# Patient Record
Sex: MALE | Race: WHITE
[De-identification: names, ages, dates, MRNs, and addresses within clinical notes are randomized per-mention and may not be internally consistent; named-entity substitution may affect disease eponyms.]

---

## 2017-09-02 ENCOUNTER — HOSPITAL ENCOUNTER (EMERGENCY)
Dept: HOSPITAL 61 - ER | Age: 47
Discharge: HOME | End: 2017-09-02
Payer: COMMERCIAL

## 2017-09-02 VITALS — DIASTOLIC BLOOD PRESSURE: 58 MMHG | SYSTOLIC BLOOD PRESSURE: 126 MMHG

## 2017-09-02 VITALS — HEIGHT: 69 IN | BODY MASS INDEX: 29.47 KG/M2 | WEIGHT: 199 LBS

## 2017-09-02 DIAGNOSIS — J06.9: ICD-10-CM

## 2017-09-02 DIAGNOSIS — J18.1: Primary | ICD-10-CM

## 2017-09-02 DIAGNOSIS — E78.00: ICD-10-CM

## 2017-09-02 PROCEDURE — 87070 CULTURE OTHR SPECIMN AEROBIC: CPT

## 2017-09-02 PROCEDURE — 71020: CPT

## 2017-09-02 PROCEDURE — 87880 STREP A ASSAY W/OPTIC: CPT

## 2017-09-02 PROCEDURE — 99285 EMERGENCY DEPT VISIT HI MDM: CPT

## 2017-09-02 NOTE — PHYS DOC
Past Medical History


Past Medical History:  High Cholesterol, Pancreatitis


Additional Past Medical Histor:  pacreatitis


Past Surgical History:  Other


Additional Past Surgical Histo:  rt foot


Alcohol Use:  None


Drug Use:  None





Adult General


Chief Complaint


Chief Complaint:  COUGH





HPI


HPI





Patient is a 47  year old who presents with a productive cough, nasal congestion

, body aches since yesterday. Patient states he has history of pneumonia. He 

states he feels similar to the last time he had pneumonia. Patient denies any 

fever. He states his wife was in the ED today being evaluated for dislodged PEG 

tube and he takes care of her. Patient denies any chest pain or shortness of 

breath





Review of Systems


Review of Systems





Constitutional: see HPI


Eyes: Denies change in visual acuity, redness, or eye pain []


HENT:  nasal congestion 


Respiratory:  cough 


Cardiovascular: No additional information not addressed in HPI []


GI: Denies abdominal pain, nausea, vomiting, bloody stools or diarrhea []


: Denies dysuria or hematuria []


Musculoskeletal: Denies back pain or joint pain []


Integument: Denies rash or skin lesions []


Neurologic: Denies headache, focal weakness or sensory changes []


Endocrine: Denies polyuria or polydipsia []





Allergies


Allergies





Allergies








Coded Allergies Type Severity Reaction Last Updated Verified


 


  No Known Drug Allergies    4/14/15 No











Physical Exam


Physical Exam





Constitutional: Well developed, well nourished, no acute distress, non-toxic 

appearance. []


HENT: Normocephalic, atraumatic, bilateral external ears normal, oropharynx 

moist, no oral exudates, patient is congested nasally


Eyes: PERRLA, EOMI, conjunctiva normal, no discharge. [] 


Neck: Normal range of motion, no tenderness, supple, no stridor. [] 


Cardiovascular:Heart rate regular rhythm, no murmur []


Lungs & Thorax:  Bilateral breath sounds clear to auscultation []


Abdomen: Bowel sounds normal, soft, no tenderness, no masses, no pulsatile 

masses. [] 


Skin: Warm, dry, no erythema, no rash. [] 


Back: No tenderness, no CVA tenderness. [] 


Extremities: No tenderness, no cyanosis, no clubbing, ROM intact, no edema. [] 


Neurologic: Alert and oriented X 3, normal motor function, normal sensory 

function, no focal deficits noted. []


Psychologic: Affect normal, judgement normal, mood normal. []





Current Patient Data


Vital Signs





 Vital Signs








  Date Time  Temp Pulse Resp B/P (MAP) Pulse Ox O2 Delivery O2 Flow Rate FiO2


 


9/2/17 17:19 98.7 81 20  97 Room Air  





 98.7       











EKG


EKG


[]





Radiology/Procedures


Radiology/Procedures


[]





Course & Med Decision Making


Course & Med Decision Making


Pertinent Labs and Imaging studies reviewed. (See chart for details)





This is a 47-year-old male patient presented to the ED today with a productive 

cough nasal congestion and body aches since yesterday. He states he has had  

similar symptoms when he had pneumonia.





Chest x-ray interpreted by Dr. Bhardwaj was suspicious for left lower lobe 

pneumonia. Patient was given prescription for Levaquin,Tessalon Perles 

prednisone and albuterol inhaler. He was also given prescription for Tessalon 

Perles. OTC decongestants recommended. Follow-up with PCP in one week. 

Instructed to return to the ED if symptoms worsen.





Dragon Disclaimer


Dragon Disclaimer


This electronic medical record was generated, in whole or in part, using a 

voice recognition dictation system.





Departure


Departure


Impression:  


 Primary Impression:  


 Community acquired pneumonia


 Additional Impression:  


 Upper respiratory infection


Disposition:  01 HOME, SELF-CARE


Condition:  STABLE


Referrals:  


LEXX RESENDEZ MD (PCP)


Follow-up with your doctor in one week


Patient Instructions:  Pneumonia, Adult, Upper Respiratory Infection, Adult, 

Easy-to-Read





Additional Instructions:  


You were seen for pneumonia. Please take the prescribed medicines as ordered. 

Ensure you complete your antibiotics. Come back to the ED at any point symptoms 

worsen or you unable to take your prescribed medicine. Follow-up with your 

doctor next week.


Scripts


Albuterol Sulfate (Proair Respiclick) 90 Mcg [email or web address]


1 PUFF IH PRN Q6HRS Y for SHORTNESS OF BREATH, #1 INHALER


   Prov: MUTUNGATANGELA APRN         9/2/17 


Benzonatate (TESSALON PERLE) 100 Mg Capsule


1 CAP PO TID, #30 CAP


   Prov: MUTUNGATANGELA APRN         9/2/17 


Prednisone (PREDNISONE) 50 Mg Tablet


1 TAB PO DAILY, #5 TAB


   Prov: MUTUNGA,TANGELA APRN         9/2/17 


Levofloxacin (LEVAQUIN) 500 Mg Tablet


1 TAB PO DAILY, #7 TAB


   Prov: MUTUNGA,TANGELA APRN         9/2/17





Problem Qualifiers








 Primary Impression:  


 Community acquired pneumonia


 Laterality:  left  Lung location:  lower lobe of lung  Qualified Codes:  J18.1 

- Lobar pneumonia, unspecified organism


 Additional Impression:  


 Upper respiratory infection


 URI type:  unspecified URI  Qualified Codes:  J06.9 - Acute upper respiratory 

infection, unspecified








TANGELA BETANCOURT APRN Sep 2, 2017 18:37

## 2017-09-03 LAB
NEGATIVE OBC STREP: (no result)
POSITIVE OBC STREP: (no result)

## 2017-09-03 NOTE — RAD
EXAM: CHEST 2 VIEWS 



History: Cough



COMPARISON: 5/13/2015



TECHNIQUE: PA and lateral chest radiographs



FINDINGS:  The cardiomediastinal silhouette is within normal limits. Minimal

linear left lung base atelectasis or infiltrate appears less prominent

compared to prior exam.        



IMPRESSION:  Minimal left lung base atelectasis or infiltrate appears less

prominent compared to prior exam.

## 2021-02-23 ENCOUNTER — HOSPITAL ENCOUNTER (OUTPATIENT)
Dept: HOSPITAL 63 - LAB | Age: 51
End: 2021-02-23
Attending: NURSE ANESTHETIST, CERTIFIED REGISTERED
Payer: SELF-PAY

## 2021-02-23 DIAGNOSIS — Z20.822: ICD-10-CM

## 2021-02-23 DIAGNOSIS — Z01.812: Primary | ICD-10-CM

## 2021-02-23 DIAGNOSIS — H26.9: ICD-10-CM

## 2021-02-23 PROCEDURE — U0003 INFECTIOUS AGENT DETECTION BY NUCLEIC ACID (DNA OR RNA); SEVERE ACUTE RESPIRATORY SYNDROME CORONAVIRUS 2 (SARS-COV-2) (CORONAVIRUS DISEASE [COVID-19]), AMPLIFIED PROBE TECHNIQUE, MAKING USE OF HIGH THROUGHPUT TECHNOLOGIES AS DESCRIBED BY CMS-2020-01-R: HCPCS

## 2021-02-23 PROCEDURE — C9803 HOPD COVID-19 SPEC COLLECT: HCPCS

## 2021-02-26 ENCOUNTER — HOSPITAL ENCOUNTER (OUTPATIENT)
Dept: HOSPITAL 63 - SURG | Age: 51
Discharge: HOME | End: 2021-02-26
Attending: INTERNAL MEDICINE
Payer: COMMERCIAL

## 2021-02-26 VITALS
SYSTOLIC BLOOD PRESSURE: 117 MMHG | SYSTOLIC BLOOD PRESSURE: 117 MMHG | DIASTOLIC BLOOD PRESSURE: 76 MMHG | DIASTOLIC BLOOD PRESSURE: 76 MMHG

## 2021-02-26 DIAGNOSIS — Z79.899: ICD-10-CM

## 2021-02-26 DIAGNOSIS — D12.5: ICD-10-CM

## 2021-02-26 DIAGNOSIS — K29.50: ICD-10-CM

## 2021-02-26 DIAGNOSIS — R13.10: ICD-10-CM

## 2021-02-26 DIAGNOSIS — D12.8: ICD-10-CM

## 2021-02-26 DIAGNOSIS — Z88.8: ICD-10-CM

## 2021-02-26 DIAGNOSIS — K25.9: ICD-10-CM

## 2021-02-26 DIAGNOSIS — R19.5: Primary | ICD-10-CM

## 2021-02-26 DIAGNOSIS — K63.89: ICD-10-CM

## 2021-02-26 DIAGNOSIS — K57.30: ICD-10-CM

## 2021-02-26 DIAGNOSIS — R12: ICD-10-CM

## 2021-02-26 DIAGNOSIS — K64.8: ICD-10-CM

## 2021-02-26 DIAGNOSIS — Z72.89: ICD-10-CM

## 2021-02-26 DIAGNOSIS — K21.00: ICD-10-CM

## 2021-02-26 PROCEDURE — 88342 IMHCHEM/IMCYTCHM 1ST ANTB: CPT

## 2021-02-26 PROCEDURE — 45380 COLONOSCOPY AND BIOPSY: CPT

## 2021-02-26 PROCEDURE — 43239 EGD BIOPSY SINGLE/MULTIPLE: CPT

## 2021-02-26 PROCEDURE — 45385 COLONOSCOPY W/LESION REMOVAL: CPT

## 2021-02-26 PROCEDURE — 88305 TISSUE EXAM BY PATHOLOGIST: CPT

## 2021-03-03 NOTE — PATHOLOGY
[facility] Accession Number: [id number]

.                                                                01

Material submitted:                                        .

PART A: gastrointestinal site - ANTRUM BIOPSY

PART B: sigmoid colon - SIGMOID POLYP BIOPSY

PART C: rectum - RECTAL POLYP HOT SNARE

.                                                                01

Clinical history:                                          .

+ COLOGUARD

.                                                                02

**********************************************************************

Diagnosis:

A.  Gastric biopsies, antrum:

- Chronic gastritis, mild, with focal erosion and acute inflammation.

.

B.  Colon biopsy, sigmoid polyp:

- Tubular adenoma.

.

C.  Colorectal biopsies, rectal polyp x2:

- Tubular adenoma (1).

- Hyperplastic polyp (1).

(JPM:anita; 03/03/2021)

S  03/03/2021  1358 Local

**********************************************************************

.                                                                02

Comment:

Sections of the gastric biopsy reveal gastric antral/body transition

mucosa showing congestion, focal edema, and mild chronic inflammation with

focal mucosal erosion and acute inflammation.  A properly controlled

immunoperoxidase stain for Helicobacter is negative for Helicobacter

organisms.

.

Sections of the sigmoid colon biopsy reveal a tubular adenoma showing no

high grade dysplasia or evidence of malignancy.

.

Sections of the rectal biopsies reveal a single tubular adenoma and a

single hyperplastic polyp.  There is no high grade dysplasia or evidence

of malignancy.

(JPM:anita; 03/03/2021)

.

Special stain performed:  Immunoperoxidase stain for Helicobacter on A1

.                                                                02

Electronically signed:                                     .

Andres Antonio MD, Pathologist

NPI- [id number]

.                                                                01

Gross description:                                         .

A. The specimen is received in formalin, labeled "Nick Khan, biopsy

antrum" and consists of 2 fragments of pink-tan tissue measuring 0.4 x 0.3

cm and 0.5 x 0.2 cm which are entirely submitted in A1.

.

B. The specimen is received in formalin, labeled "Bill, Nick, sigmoid

polyp" and consists of a segment of tan tissue measuring 0.6 x 0.3 cm

which is entirely submitted in B1.

.

C. The specimen is received in formalin, labeled "Bill, Nick, rectal

polyp x2" and consists of 2 segments of pink-tan tissue measuring 0.6 x

0.4 cm and 0.7 x 0.5 x 0.3 cm.  The larger is inked and bisected and they

are entirely submitted in C1.

(SDY; 3/2/2021)

SYU/SYU  03/02/2021  1221 Local

.                                                                02

Pathologist provided ICD-10:

K29.50, K29.00, K25.9, D12.5, D12.8, K62.1

.                                                                02

CPT                                                        .

260343, 504784, 140705, [id number]

Specimen Comment: Report sent to  / 

***Performed at:  01

   [facility]

   [address], [place], [place]  [id number]

   MD Theodore Bower MD Phone:  [phone]

***Performed at:  02

   [facility]

   [address], [place]  [id number]

   MD Andres Antonio MD Phone:  [phone]